# Patient Record
Sex: FEMALE | URBAN - METROPOLITAN AREA
[De-identification: names, ages, dates, MRNs, and addresses within clinical notes are randomized per-mention and may not be internally consistent; named-entity substitution may affect disease eponyms.]

---

## 2019-08-28 ENCOUNTER — TELEPHONE (OUTPATIENT)
Dept: TRANSPLANT | Facility: CLINIC | Age: 29
End: 2019-08-28

## 2019-08-28 DIAGNOSIS — Z00.5 TRANSPLANT DONOR EVALUATION: Primary | ICD-10-CM

## 2019-08-28 NOTE — TELEPHONE ENCOUNTER
Speaks French. Unknown abo. Will come for labs at [facility] on 8/31/19.

## 2019-08-28 NOTE — TELEPHONE ENCOUNTER
"Bisi CHAPPELL  [id number]      LIVING KIDNEY DONOR EVALUATION  Donor First Name Noe Donor MRN    Donor Last Name Lorie Completed 2019 7:34 PM    1990 Record ID [id number]   BREEZE Screen PASSED     Intended Recipient  Recipient First Name Gerda Recipient MRN    Recipient Last Name Alex forrest Relationship Child   Recipient  1957 Recipient Diagnosis    Recipient's ABO      Donor Information  Age 29 Gender Female   Ht 157 cm (5' 2'') Race Not Specified   Wt 68.0 kg (150 lbs) Ethnicity /   BMI 27.40 kg/m  Preferred Language Polish      Required Yes     Blood Type Unknown   Demographics  Home Address [address] [place] [address] # [phone]   [facility] Type [facility] Alternate #    Zip Code [address] Type    Country [place] Preferred Contact day Mon, Fri, Thur, Wed, Tue   Email  Preferred Contact time 1:00 PM-4:00 PM   &&   Donor's Medical Information  Medical History GERD   s/p  Medications None Reported   Surgical History Gastric Surgery, NOS Allergies NKDA   Social History EtOH: Denies   Illicit Drug Use: Denies   Tobacco: Denies Self-Reported Functional Status \"I am able to climb 2 flights of stairs without stopping\"   Family Medical History Cancer (denies)   Diabetes (denies)   Heart Disease (denies)   Hypertension (denies)   Kidney Disease (Mother)   Kidney Stones (denies) Exercise Frequency Exercise (Not on a regular basis)   Review of Organ Systems  Review of Systems Airway or Lungs: No   Blood Disorder: No   Cancer: No   Diabetes,Thyroid,Adrenal,Endocrine Disorder: No   Digestive or Liver: Yes   Female Health: Yes   Heart or Circulatory System: No   Immune Diseases: No   Kidneys and Bladder: No   Muscles,Bones,Joints: No   Neuro: No   Psych: No   &&   Donor's Social Information  Marital Status  Living Accommodation Lives in rented accommodation   Level of Education Some secondary or high school " education Living Arrangement With spouse   Employment Status Full Time Concerns: health and life insurance Yes   Employer [employer] services Concerns: job security and lost income Yes   Occupation      Medical Insurance Status No medical insurance     High Risk Behavior  High Risk Behaviors Blood transfusion < 12 months. (NO)   Commercial sex < 12 months. (NO)   Illicit IV drug use < 5yrs. (NO)   Other high risk sexual contact < 12 months. (NO)   Reason for Donation  Referral Tx Candidate Reason for Donation For my mother   Permission to Disclose Inquiry Yes Patient Comments    Donor Motivation Level Highly motivated donor     PCP Contact  PCP Name    PCP [facility]    PCP State    PCP Phone    Emergency Contact  First Name Jordi First Name    Last Name Lorie Last Name    Phone # [phone] Phone #    Phone Type Mobile Phone Type    Relationship Sibling Relationship    Office Use  Reviewed By    Reviewed 8/28/2019 10:34 AM   Admin Folder Archive   Comments    Lost for Followup    Extended Comments    BREEZE ID [email or web address]:[id number] survey status completed   Activity History  Call  Task    Due Date 8/28/2019   Last Modified Date/Time 8/28/2019 9:55 AM   Comments

## 2019-08-31 DIAGNOSIS — Z00.5 TRANSPLANT DONOR EVALUATION: ICD-10-CM

## 2019-08-31 LAB
ABO + RH BLD: NORMAL
ABO + RH BLD: NORMAL
ALBUMIN UR-MCNC: NEGATIVE MG/DL
APPEARANCE UR: CLEAR
BILIRUB UR QL STRIP: NEGATIVE
COLOR UR AUTO: YELLOW
CREAT SERPL-MCNC: 0.66 MG/DL (ref 0.52–1.04)
CREAT UR-MCNC: 88 MG/DL
GFR SERPL CREATININE-BSD FRML MDRD: >90 ML/MIN/{1.73_M2}
GLUCOSE SERPL-MCNC: 88 MG/DL (ref 70–99)
GLUCOSE UR STRIP-MCNC: NEGATIVE MG/DL
HGB BLD-MCNC: 14.5 G/DL (ref 11.7–15.7)
HGB UR QL STRIP: ABNORMAL
KETONES UR STRIP-MCNC: NEGATIVE MG/DL
LEUKOCYTE ESTERASE UR QL STRIP: NEGATIVE
MICROALBUMIN UR-MCNC: 5 MG/L
MICROALBUMIN/CREAT UR: 5.85 MG/G CR (ref 0–25)
MUCOUS THREADS #/AREA URNS LPF: PRESENT /LPF
NITRATE UR QL: NEGATIVE
PH UR STRIP: 7 PH (ref 5–7)
PROT UR-MCNC: 0.08 G/L
PROT/CREAT 24H UR: 0.09 G/G CR (ref 0–0.2)
RBC #/AREA URNS AUTO: 5 /HPF (ref 0–2)
SOURCE: ABNORMAL
SP GR UR STRIP: 1.01 (ref 1–1.03)
SPECIMEN EXP DATE BLD: NORMAL
SQUAMOUS #/AREA URNS AUTO: <1 /HPF (ref 0–1)
UROBILINOGEN UR STRIP-MCNC: 0 MG/DL (ref 0–2)
WBC #/AREA URNS AUTO: 1 /HPF (ref 0–5)

## 2019-09-06 ENCOUNTER — TELEPHONE (OUTPATIENT)
Dept: TRANSPLANT | Facility: CLINIC | Age: 29
End: 2019-09-06

## 2019-09-06 DIAGNOSIS — Z00.5 TRANSPLANT DONOR EVALUATION: Primary | ICD-10-CM

## 2019-09-06 NOTE — TELEPHONE ENCOUNTER
Sister Jordi(who speaks Malian)told Noe that her abo=A-same as recip/Mom.Did not have menses when UA done.Will put in orders to repeat UA.

## 2019-09-07 DIAGNOSIS — Z00.5 TRANSPLANT DONOR EVALUATION: ICD-10-CM

## 2019-09-07 LAB
ALBUMIN UR-MCNC: NEGATIVE MG/DL
APPEARANCE UR: CLEAR
BILIRUB UR QL STRIP: NEGATIVE
COLOR UR AUTO: YELLOW
GLUCOSE UR STRIP-MCNC: NEGATIVE MG/DL
HGB UR QL STRIP: ABNORMAL
KETONES UR STRIP-MCNC: NEGATIVE MG/DL
LEUKOCYTE ESTERASE UR QL STRIP: NEGATIVE
MUCOUS THREADS #/AREA URNS LPF: PRESENT /LPF
NITRATE UR QL: NEGATIVE
PH UR STRIP: 5 PH (ref 5–7)
RBC #/AREA URNS AUTO: 8 /HPF (ref 0–2)
SOURCE: ABNORMAL
SP GR UR STRIP: 1.02 (ref 1–1.03)
SQUAMOUS #/AREA URNS AUTO: 1 /HPF (ref 0–1)
UROBILINOGEN UR STRIP-MCNC: 0 MG/DL (ref 0–2)
WBC #/AREA URNS AUTO: 1 /HPF (ref 0–5)

## 2019-10-02 ENCOUNTER — TELEPHONE (OUTPATIENT)
Dept: TRANSPLANT | Facility: CLINIC | Age: 29
End: 2019-10-02

## 2019-10-31 ENCOUNTER — TELEPHONE (OUTPATIENT)
Dept: TRANSPLANT | Facility: CLINIC | Age: 29
End: 2019-10-31

## 2019-10-31 NOTE — TELEPHONE ENCOUNTER
Dr. Luna called me regarding this potential donor with hematuria.  He said she cam to him after we told her she could not be a donor due to RBC in urine and that she need to see a doctor.  Recipient has an approved donor which I found out after I talked to Dr. Padilla.  Sharmin will bring this concern.  Sharmin will bring this concern to Dr. Morris.  It appears that other sibs have hematuria

## 2019-11-06 ENCOUNTER — TELEPHONE (OUTPATIENT)
Dept: TRANSPLANT | Facility: CLINIC | Age: 29
End: 2019-11-06

## 2019-11-06 ENCOUNTER — COMMITTEE REVIEW (OUTPATIENT)
Dept: TRANSPLANT | Facility: CLINIC | Age: 29
End: 2019-11-06

## 2019-11-06 NOTE — TELEPHONE ENCOUNTER
I called Dr. Luna to let him know the recommendations by Dr. Morris, Kidney biopsy, see general nephrology.

## 2019-11-06 NOTE — COMMITTEE REVIEW
Abdominal Patient Discussion Note Transplant Coordinator: No matching coordinators  Transplant Surgeon:       Referring Physician: Referred Self    Committee Review Members:  Brought to donor committee question from Dr. Luna whom was seeing Noe at [facility]. Noe  was denied as a kidney donor due to hematuria.   Dr. Morris stated she cannot be a donor and the choices would be a kidney biopsy and or refer to general nephrology.  I will communicate this message to Dr. Luna.    Additional Discussion Notes and Findings: